# Patient Record
Sex: FEMALE | Race: BLACK OR AFRICAN AMERICAN | NOT HISPANIC OR LATINO | URBAN - METROPOLITAN AREA
[De-identification: names, ages, dates, MRNs, and addresses within clinical notes are randomized per-mention and may not be internally consistent; named-entity substitution may affect disease eponyms.]

---

## 2021-09-12 ENCOUNTER — EMERGENCY (EMERGENCY)
Facility: HOSPITAL | Age: 26
LOS: 1 days | Discharge: HOME | End: 2021-09-12
Attending: EMERGENCY MEDICINE
Payer: COMMERCIAL

## 2021-09-12 VITALS
HEART RATE: 80 BPM | DIASTOLIC BLOOD PRESSURE: 71 MMHG | WEIGHT: 134.92 LBS | RESPIRATION RATE: 18 BRPM | TEMPERATURE: 99 F | HEIGHT: 68 IN | OXYGEN SATURATION: 97 % | SYSTOLIC BLOOD PRESSURE: 105 MMHG

## 2021-09-12 VITALS
TEMPERATURE: 98 F | DIASTOLIC BLOOD PRESSURE: 74 MMHG | RESPIRATION RATE: 18 BRPM | HEART RATE: 73 BPM | SYSTOLIC BLOOD PRESSURE: 107 MMHG | OXYGEN SATURATION: 97 %

## 2021-09-12 LAB
ALBUMIN SERPL ELPH-MCNC: 4 G/DL — SIGNIFICANT CHANGE UP (ref 3.3–5)
ALP SERPL-CCNC: 44 U/L — SIGNIFICANT CHANGE UP (ref 40–120)
ALT FLD-CCNC: 8 U/L — LOW (ref 10–45)
ANION GAP SERPL CALC-SCNC: 10 MMOL/L — SIGNIFICANT CHANGE UP (ref 5–17)
AST SERPL-CCNC: 10 U/L — SIGNIFICANT CHANGE UP (ref 10–40)
BASOPHILS # BLD AUTO: 0.02 K/UL — SIGNIFICANT CHANGE UP (ref 0–0.2)
BASOPHILS NFR BLD AUTO: 0.3 % — SIGNIFICANT CHANGE UP (ref 0–2)
BILIRUB SERPL-MCNC: 0.1 MG/DL — LOW (ref 0.2–1.2)
BUN SERPL-MCNC: 10 MG/DL — SIGNIFICANT CHANGE UP (ref 7–23)
CALCIUM SERPL-MCNC: 9.5 MG/DL — SIGNIFICANT CHANGE UP (ref 8.4–10.5)
CHLORIDE SERPL-SCNC: 107 MMOL/L — SIGNIFICANT CHANGE UP (ref 96–108)
CO2 SERPL-SCNC: 22 MMOL/L — SIGNIFICANT CHANGE UP (ref 22–31)
CREAT SERPL-MCNC: 0.77 MG/DL — SIGNIFICANT CHANGE UP (ref 0.5–1.3)
EOSINOPHIL # BLD AUTO: 0.03 K/UL — SIGNIFICANT CHANGE UP (ref 0–0.5)
EOSINOPHIL NFR BLD AUTO: 0.5 % — SIGNIFICANT CHANGE UP (ref 0–6)
GLUCOSE SERPL-MCNC: 100 MG/DL — HIGH (ref 70–99)
HCT VFR BLD CALC: 37.6 % — SIGNIFICANT CHANGE UP (ref 34.5–45)
HGB BLD-MCNC: 11.6 G/DL — SIGNIFICANT CHANGE UP (ref 11.5–15.5)
IMM GRANULOCYTES NFR BLD AUTO: 0.3 % — SIGNIFICANT CHANGE UP (ref 0–1.5)
LYMPHOCYTES # BLD AUTO: 1.03 K/UL — SIGNIFICANT CHANGE UP (ref 1–3.3)
LYMPHOCYTES # BLD AUTO: 16.3 % — SIGNIFICANT CHANGE UP (ref 13–44)
MAGNESIUM SERPL-MCNC: 2 MG/DL — SIGNIFICANT CHANGE UP (ref 1.6–2.6)
MCHC RBC-ENTMCNC: 28.3 PG — SIGNIFICANT CHANGE UP (ref 27–34)
MCHC RBC-ENTMCNC: 30.9 GM/DL — LOW (ref 32–36)
MCV RBC AUTO: 91.7 FL — SIGNIFICANT CHANGE UP (ref 80–100)
MONOCYTES # BLD AUTO: 0.53 K/UL — SIGNIFICANT CHANGE UP (ref 0–0.9)
MONOCYTES NFR BLD AUTO: 8.4 % — SIGNIFICANT CHANGE UP (ref 2–14)
NEUTROPHILS # BLD AUTO: 4.68 K/UL — SIGNIFICANT CHANGE UP (ref 1.8–7.4)
NEUTROPHILS NFR BLD AUTO: 74.2 % — SIGNIFICANT CHANGE UP (ref 43–77)
NRBC # BLD: 0 /100 WBCS — SIGNIFICANT CHANGE UP (ref 0–0)
PHOSPHATE SERPL-MCNC: 3.7 MG/DL — SIGNIFICANT CHANGE UP (ref 2.5–4.5)
PLATELET # BLD AUTO: 179 K/UL — SIGNIFICANT CHANGE UP (ref 150–400)
POTASSIUM SERPL-MCNC: 3.8 MMOL/L — SIGNIFICANT CHANGE UP (ref 3.5–5.3)
POTASSIUM SERPL-SCNC: 3.8 MMOL/L — SIGNIFICANT CHANGE UP (ref 3.5–5.3)
PROT SERPL-MCNC: 7.2 G/DL — SIGNIFICANT CHANGE UP (ref 6–8.3)
RBC # BLD: 4.1 M/UL — SIGNIFICANT CHANGE UP (ref 3.8–5.2)
RBC # FLD: 13.7 % — SIGNIFICANT CHANGE UP (ref 10.3–14.5)
SODIUM SERPL-SCNC: 139 MMOL/L — SIGNIFICANT CHANGE UP (ref 135–145)
WBC # BLD: 6.31 K/UL — SIGNIFICANT CHANGE UP (ref 3.8–10.5)
WBC # FLD AUTO: 6.31 K/UL — SIGNIFICANT CHANGE UP (ref 3.8–10.5)

## 2021-09-12 PROCEDURE — 93010 ELECTROCARDIOGRAM REPORT: CPT

## 2021-09-12 PROCEDURE — 99284 EMERGENCY DEPT VISIT MOD MDM: CPT

## 2021-09-12 PROCEDURE — 99283 EMERGENCY DEPT VISIT LOW MDM: CPT

## 2021-09-12 PROCEDURE — 93005 ELECTROCARDIOGRAM TRACING: CPT

## 2021-09-12 RX ORDER — ACETAMINOPHEN 500 MG
975 TABLET ORAL ONCE
Refills: 0 | Status: COMPLETED | OUTPATIENT
Start: 2021-09-12 | End: 2021-09-12

## 2021-09-12 RX ADMIN — Medication 975 MILLIGRAM(S): at 11:23

## 2021-09-12 NOTE — ED ADULT TRIAGE NOTE - CHIEF COMPLAINT QUOTE
" I passed out last night at 1am while walking" Witnessed by friends, confirms hitting head, confirms LOC, denies ac use,

## 2021-09-12 NOTE — ED PROVIDER NOTE - CARE PROVIDER_API CALL
Ryan Munroe  INTERNAL MEDICINE  [address] [place] [address], [address]  [place] [id number]  Phone: [phone]  Fax: [phone]  Established Patient  Follow Up Time: 4-6 Days

## 2021-09-12 NOTE — ED PROVIDER NOTE - OBJECTIVE STATEMENT
27 yo F, no sig PMH, presents with posterior headache s/p fall last night when she struck the back of her head around 1 AM. She states that she was walking home with a friend when she felt slightly lightheaded, tripped and fell, striking her head on the sidewalk and passing out "for two seconds." She says that her friend witnessed the event and she came right back around--no seizure like activity, no urinary incontinence, no tongue biting, no laceration or bleeding. She got upped and finished her walk home. Since then she has had a dull pain in her posterior head where she hit it. She denies any other complaints such as blurry vision, focal weakness, recent fevers, chills, chest pain, SOB, palpitations, nausea, vomiting, abdominal pain, diarrhea, melena, hematochezia, dysuria, hematuria. She is currently two days into her menstrual period and says that it is her normal amount of vaginal bleeding. 25 yo F, no sig PMH, presents with posterior headache s/p fall last night when she struck the back of her head around 1 AM. She states that she was walking home with a friend when she felt slightly lightheaded, tripped and fell, striking her head on the sidewalk and passing out "for two seconds." She says that her friend witnessed the event and she came right back around--no seizure like activity, no urinary incontinence, no tongue biting, no laceration or bleeding. She got upped and finished her walk home. Since then she has had a dull pain in her posterior head where she hit it. She denies any other complaints such as blurry vision, focal weakness, recent fevers, chills, chest pain, SOB, palpitations, nausea, vomiting, abdominal pain, diarrhea, melena, hematochezia, dysuria, hematuria. She is currently two days into her menstrual period and says that it is her normal amount of vaginal bleeding.    Attendinyo female presents s/p syncope at 1am last night.  pt was with a friend and felt a bit lightheaded while walking home.  fainted and hit her head.  no post-ictal period.  now has mild headache which has been improving.  no vomiting or nausea.

## 2021-09-12 NOTE — ED PROVIDER NOTE - PATIENT PORTAL LINK FT
You can access the FollowMyHealth Patient Portal offered by [facility] by registering at the following website: [email or web address] By joining [facility]’s FollowMyHealth portal, you will also be able to view your health information using other applications (apps) compatible with our system.

## 2021-09-12 NOTE — ED PROVIDER NOTE - PHYSICAL EXAMINATION
PHYSICAL EXAM:  GENERAL: Sitting comfortable in bed, in no acute distress  HENMT: Atraumatic, moist mucous membranes, no oropharyngeal exudates or vesicles, uvula is midline EYES: Clear bilaterally, PERRL, EOMs intact b/l  HEART: RRR, S1/S2, no murmur/gallops/rubs  RESPIRATORY: Clear to auscultation bilaterally, no wheezes/rhonchi/rales  ABDOMEN: +BS, soft, nontender, nondistended  EXTREMITIES: No lower extremity edema, +2 radial pulses b/l  NEURO:  A&Ox4, CNs II-XII intact, strength 5/5 x4, sensation grossly intact and symmetric, FTN smooth and coordinated b/l, no pronator drift, neg romberg, gait normal  Heme/LYMPH: No ecchymosis or bruising, no anterior/posterior cervical or supraclavicular LAD  SKIN:  Skin normal color for race, warm, dry and intact. No evidence of rash.

## 2021-09-12 NOTE — ED PROVIDER NOTE - NSFOLLOWUPINSTRUCTIONS_ED_ALL_ED_FT
You were seen in the Emergency Department for headache after a fall, your bloodwork and neurologic exam were completely within normal limits. There was no sign of concerning neurologic injury or electrolyte abnormality or anemia on bloodwork.     1) Advance activity as tolerated.   2) Continue all previously prescribed medications as directed.    3) Follow up with your primary care physician in 24-48 hours - take copies of your results.    4) Return to the Emergency Department for worsening or persistent symptoms, and/or ANY NEW OR CONCERNING SYMPTOMS.

## 2021-09-12 NOTE — ED PROVIDER NOTE - PROGRESS NOTE DETAILS
Markus Graham MD (PGY-2): Symptoms improved in ED, no signs of anemia or electrolyte abnormalities on bloodwork. Discussed with patient and Mom at bedside, agree with plan for discharge and outpatient follow up with PMB, Dr. Hurtado.

## 2021-09-12 NOTE — ED PROVIDER NOTE - NS ED ROS FT
Gen: No fever, normal appetite  Eyes: No eye irritation or discharge  ENT: No ear pain, congestion, sore throat  Resp: No cough or trouble breathing  Cardiovascular: No chest pain or palpitation  Gastroenteric: No nausea/vomiting, diarrhea, constipation  :  No change in urine output; no dysuria  MS: No joint or muscle pain  Skin: No rashes  Neuro: see HPI  Remainder negative, except as per the HPI

## 2021-09-12 NOTE — ED PROVIDER NOTE - CLINICAL SUMMARY MEDICAL DECISION MAKING FREE TEXT BOX
Markus Graham MD (PGY-2): 27 yo F no sig PMH, p/w Markus Graham MD (PGY-2): 27 yo F no sig PMH, p/w headache s/p fall with head strike, vague presyncopal symptom history, no hypotension here, afebrile, no neurologic deficits, exam grossly wnl, no C-spine tenderness to palpation, no hemotympanum, no csf otorrhea or rhinorrhea, no gasca sign, no raccoon eyes.  Pt cleared by Spanish head CT rule, no indication for neurologic imaging at this time. Will obtain cbc/cmp/mg/phos to eval for anemia/electrolyte abnormality. Anticipate DC with outpt follow up.

## 2021-09-12 NOTE — ED ADULT NURSE NOTE - OBJECTIVE STATEMENT
26y female no significant pmhx, presenting to ED complaining of headache s/p fall last night and states she hit her head in the process. pt states she was walking home with a friend at 1am and she felt light headed, tripped and fell, striking her head on the sidewalk and  "passed out for 2 seconds" . pt states her friend saw the event, she denies any seizure like activity, urinary incontinence, tongue bitting, no laceration/ active bleeding. she states she was able to get up and walk home and since then has had a dull headache/pain where she has hit her head. she denies blurry vision, weakness, fevers/chills, NVD, chest pain, SOB, palpitations, urinary symptoms, blood in urine and stool, back pain. pt states she is currently on her menstrual cycle day 2 with her normal amount of bleeding.